# Patient Record
Sex: FEMALE | Race: WHITE | NOT HISPANIC OR LATINO | URBAN - METROPOLITAN AREA
[De-identification: names, ages, dates, MRNs, and addresses within clinical notes are randomized per-mention and may not be internally consistent; named-entity substitution may affect disease eponyms.]

---

## 2018-03-05 ENCOUNTER — OUTPATIENT (OUTPATIENT)
Dept: OUTPATIENT SERVICES | Facility: HOSPITAL | Age: 43
LOS: 1 days | Discharge: HOME | End: 2018-03-05

## 2018-03-06 DIAGNOSIS — N89.8 OTHER SPECIFIED NONINFLAMMATORY DISORDERS OF VAGINA: ICD-10-CM

## 2018-03-31 ENCOUNTER — INPATIENT (INPATIENT)
Facility: HOSPITAL | Age: 43
LOS: 3 days | Discharge: HOME | End: 2018-04-04
Attending: OBSTETRICS & GYNECOLOGY | Admitting: OBSTETRICS & GYNECOLOGY

## 2018-03-31 VITALS — DIASTOLIC BLOOD PRESSURE: 88 MMHG | HEART RATE: 102 BPM | SYSTOLIC BLOOD PRESSURE: 134 MMHG | TEMPERATURE: 98 F

## 2018-03-31 LAB
ALBUMIN SERPL ELPH-MCNC: 2.9 G/DL — LOW (ref 3.5–5.2)
ALP SERPL-CCNC: 119 U/L — HIGH (ref 30–115)
ALT FLD-CCNC: 8 U/L — SIGNIFICANT CHANGE UP (ref 0–41)
AMPHET UR-MCNC: NEGATIVE — SIGNIFICANT CHANGE UP
ANION GAP SERPL CALC-SCNC: 12 MMOL/L — SIGNIFICANT CHANGE UP (ref 7–14)
APPEARANCE UR: (no result)
APPEARANCE UR: (no result)
AST SERPL-CCNC: 27 U/L — SIGNIFICANT CHANGE UP (ref 0–41)
BACTERIA # UR AUTO: (no result) /HPF
BACTERIA # UR AUTO: (no result) /HPF
BARBITURATES UR SCN-MCNC: NEGATIVE — SIGNIFICANT CHANGE UP
BASOPHILS # BLD AUTO: 0.05 K/UL — SIGNIFICANT CHANGE UP (ref 0–0.2)
BASOPHILS # BLD AUTO: 0.07 K/UL — SIGNIFICANT CHANGE UP (ref 0–0.2)
BASOPHILS NFR BLD AUTO: 0.3 % — SIGNIFICANT CHANGE UP (ref 0–1)
BASOPHILS NFR BLD AUTO: 0.3 % — SIGNIFICANT CHANGE UP (ref 0–1)
BENZODIAZ UR-MCNC: NEGATIVE — SIGNIFICANT CHANGE UP
BILIRUB SERPL-MCNC: 0.4 MG/DL — SIGNIFICANT CHANGE UP (ref 0.2–1.2)
BILIRUB UR-MCNC: NEGATIVE — SIGNIFICANT CHANGE UP
BILIRUB UR-MCNC: NEGATIVE — SIGNIFICANT CHANGE UP
BLD GP AB SCN SERPL QL: SIGNIFICANT CHANGE UP
BUN SERPL-MCNC: 8 MG/DL — LOW (ref 10–20)
CALCIUM SERPL-MCNC: 8 MG/DL — LOW (ref 8.5–10.1)
CHLORIDE SERPL-SCNC: 102 MMOL/L — SIGNIFICANT CHANGE UP (ref 98–110)
CO2 SERPL-SCNC: 25 MMOL/L — SIGNIFICANT CHANGE UP (ref 17–32)
COCAINE METAB.OTHER UR-MCNC: NEGATIVE — SIGNIFICANT CHANGE UP
COLOR SPEC: YELLOW — SIGNIFICANT CHANGE UP
COLOR SPEC: YELLOW — SIGNIFICANT CHANGE UP
CREAT SERPL-MCNC: 0.6 MG/DL — LOW (ref 0.7–1.5)
DIFF PNL FLD: (no result)
DIFF PNL FLD: (no result)
EOSINOPHIL # BLD AUTO: 0.02 K/UL — SIGNIFICANT CHANGE UP (ref 0–0.7)
EOSINOPHIL # BLD AUTO: 0.04 K/UL — SIGNIFICANT CHANGE UP (ref 0–0.7)
EOSINOPHIL NFR BLD AUTO: 0.1 % — SIGNIFICANT CHANGE UP (ref 0–8)
EOSINOPHIL NFR BLD AUTO: 0.2 % — SIGNIFICANT CHANGE UP (ref 0–8)
EPI CELLS # UR: (no result) /HPF
EPI CELLS # UR: (no result) /HPF
GLUCOSE SERPL-MCNC: 66 MG/DL — LOW (ref 70–99)
GLUCOSE UR QL: NEGATIVE MG/DL — SIGNIFICANT CHANGE UP
GLUCOSE UR QL: NEGATIVE MG/DL — SIGNIFICANT CHANGE UP
HCT VFR BLD CALC: 30.8 % — LOW (ref 37–47)
HCT VFR BLD CALC: 36 % — LOW (ref 37–47)
HGB BLD-MCNC: 10.6 G/DL — LOW (ref 12–16)
HGB BLD-MCNC: 12.5 G/DL — SIGNIFICANT CHANGE UP (ref 12–16)
IMM GRANULOCYTES NFR BLD AUTO: 1 % — HIGH (ref 0.1–0.3)
IMM GRANULOCYTES NFR BLD AUTO: 1.1 % — HIGH (ref 0.1–0.3)
KETONES UR-MCNC: NEGATIVE — SIGNIFICANT CHANGE UP
KETONES UR-MCNC: NEGATIVE — SIGNIFICANT CHANGE UP
LEUKOCYTE ESTERASE UR-ACNC: (no result)
LEUKOCYTE ESTERASE UR-ACNC: NEGATIVE — SIGNIFICANT CHANGE UP
LYMPHOCYTES # BLD AUTO: 13.1 % — LOW (ref 20.5–51.1)
LYMPHOCYTES # BLD AUTO: 2.32 K/UL — SIGNIFICANT CHANGE UP (ref 1.2–3.4)
LYMPHOCYTES # BLD AUTO: 2.62 K/UL — SIGNIFICANT CHANGE UP (ref 1.2–3.4)
LYMPHOCYTES # BLD AUTO: 9.3 % — LOW (ref 20.5–51.1)
MCHC RBC-ENTMCNC: 33.2 PG — HIGH (ref 27–31)
MCHC RBC-ENTMCNC: 33.4 PG — HIGH (ref 27–31)
MCHC RBC-ENTMCNC: 34.4 G/DL — SIGNIFICANT CHANGE UP (ref 32–37)
MCHC RBC-ENTMCNC: 34.7 G/DL — SIGNIFICANT CHANGE UP (ref 32–37)
MCV RBC AUTO: 95.5 FL — SIGNIFICANT CHANGE UP (ref 81–99)
MCV RBC AUTO: 97.2 FL — SIGNIFICANT CHANGE UP (ref 81–99)
METHADONE UR-MCNC: NEGATIVE — SIGNIFICANT CHANGE UP
MONOCYTES # BLD AUTO: 0.82 K/UL — HIGH (ref 0.1–0.6)
MONOCYTES # BLD AUTO: 0.99 K/UL — HIGH (ref 0.1–0.6)
MONOCYTES NFR BLD AUTO: 4 % — SIGNIFICANT CHANGE UP (ref 1.7–9.3)
MONOCYTES NFR BLD AUTO: 4.1 % — SIGNIFICANT CHANGE UP (ref 1.7–9.3)
NEUTROPHILS # BLD AUTO: 16.2 K/UL — HIGH (ref 1.4–6.5)
NEUTROPHILS # BLD AUTO: 21.39 K/UL — HIGH (ref 1.4–6.5)
NEUTROPHILS NFR BLD AUTO: 81.2 % — HIGH (ref 42.2–75.2)
NEUTROPHILS NFR BLD AUTO: 85.3 % — HIGH (ref 42.2–75.2)
NITRITE UR-MCNC: NEGATIVE — SIGNIFICANT CHANGE UP
NITRITE UR-MCNC: NEGATIVE — SIGNIFICANT CHANGE UP
NRBC # BLD: 0 /100 WBCS — SIGNIFICANT CHANGE UP (ref 0–0)
NRBC # BLD: 0 /100 WBCS — SIGNIFICANT CHANGE UP (ref 0–0)
OPIATES UR-MCNC: NEGATIVE — SIGNIFICANT CHANGE UP
PCP SPEC-MCNC: SIGNIFICANT CHANGE UP
PH UR: 6 — SIGNIFICANT CHANGE UP (ref 5–8)
PH UR: 7 — SIGNIFICANT CHANGE UP (ref 5–8)
PLATELET # BLD AUTO: 238 K/UL — SIGNIFICANT CHANGE UP (ref 130–400)
PLATELET # BLD AUTO: 315 K/UL — SIGNIFICANT CHANGE UP (ref 130–400)
POTASSIUM SERPL-MCNC: 4.5 MMOL/L — SIGNIFICANT CHANGE UP (ref 3.5–5)
POTASSIUM SERPL-SCNC: 4.5 MMOL/L — SIGNIFICANT CHANGE UP (ref 3.5–5)
PRENATAL SYPHILIS TEST: SIGNIFICANT CHANGE UP
PROPOXYPHENE QUALITATIVE URINE RESULT: NEGATIVE — SIGNIFICANT CHANGE UP
PROT SERPL-MCNC: 5 G/DL — LOW (ref 6–8)
PROT UR-MCNC: (no result) MG/DL
PROT UR-MCNC: NEGATIVE MG/DL — SIGNIFICANT CHANGE UP
RBC # BLD: 3.17 M/UL — LOW (ref 4.2–5.4)
RBC # BLD: 3.77 M/UL — LOW (ref 4.2–5.4)
RBC # FLD: 12.1 % — SIGNIFICANT CHANGE UP (ref 11.5–14.5)
RBC # FLD: 12.2 % — SIGNIFICANT CHANGE UP (ref 11.5–14.5)
RBC CASTS # UR COMP ASSIST: (no result) /HPF
SODIUM SERPL-SCNC: 139 MMOL/L — SIGNIFICANT CHANGE UP (ref 135–146)
SP GR SPEC: 1.01 — SIGNIFICANT CHANGE UP (ref 1.01–1.03)
SP GR SPEC: 1.01 — SIGNIFICANT CHANGE UP (ref 1.01–1.03)
TYPE + AB SCN PNL BLD: SIGNIFICANT CHANGE UP
UROBILINOGEN FLD QL: 0.2 MG/DL — SIGNIFICANT CHANGE UP (ref 0.2–0.2)
UROBILINOGEN FLD QL: 0.2 MG/DL — SIGNIFICANT CHANGE UP (ref 0.2–0.2)
WBC # BLD: 19.94 K/UL — HIGH (ref 4.8–10.8)
WBC # BLD: 25.05 K/UL — HIGH (ref 4.8–10.8)
WBC # FLD AUTO: 19.94 K/UL — HIGH (ref 4.8–10.8)
WBC # FLD AUTO: 25.05 K/UL — HIGH (ref 4.8–10.8)
WBC UR QL: SIGNIFICANT CHANGE UP /HPF

## 2018-03-31 RX ORDER — OXYCODONE AND ACETAMINOPHEN 5; 325 MG/1; MG/1
2 TABLET ORAL EVERY 6 HOURS
Qty: 0 | Refills: 0 | Status: DISCONTINUED | OUTPATIENT
Start: 2018-03-31 | End: 2018-04-04

## 2018-03-31 RX ORDER — AMPICILLIN TRIHYDRATE 250 MG
2 CAPSULE ORAL ONCE
Qty: 0 | Refills: 0 | Status: DISCONTINUED | OUTPATIENT
Start: 2018-03-31 | End: 2018-03-31

## 2018-03-31 RX ORDER — LANOLIN
1 OINTMENT (GRAM) TOPICAL
Qty: 0 | Refills: 0 | Status: DISCONTINUED | OUTPATIENT
Start: 2018-03-31 | End: 2018-04-04

## 2018-03-31 RX ORDER — DOCUSATE SODIUM 100 MG
100 CAPSULE ORAL
Qty: 0 | Refills: 0 | Status: DISCONTINUED | OUTPATIENT
Start: 2018-03-31 | End: 2018-04-04

## 2018-03-31 RX ORDER — OXYTOCIN 10 UNIT/ML
41.67 VIAL (ML) INJECTION
Qty: 20 | Refills: 0 | Status: DISCONTINUED | OUTPATIENT
Start: 2018-03-31 | End: 2018-04-04

## 2018-03-31 RX ORDER — OXYCODONE AND ACETAMINOPHEN 5; 325 MG/1; MG/1
1 TABLET ORAL
Qty: 0 | Refills: 0 | Status: DISCONTINUED | OUTPATIENT
Start: 2018-03-31 | End: 2018-04-04

## 2018-03-31 RX ORDER — GENTAMICIN SULFATE 40 MG/ML
80 VIAL (ML) INJECTION EVERY 8 HOURS
Qty: 0 | Refills: 0 | Status: DISCONTINUED | OUTPATIENT
Start: 2018-03-31 | End: 2018-03-31

## 2018-03-31 RX ORDER — ENOXAPARIN SODIUM 100 MG/ML
40 INJECTION SUBCUTANEOUS AT BEDTIME
Qty: 0 | Refills: 0 | Status: DISCONTINUED | OUTPATIENT
Start: 2018-03-31 | End: 2018-04-04

## 2018-03-31 RX ORDER — GENTAMICIN SULFATE 40 MG/ML
VIAL (ML) INJECTION
Qty: 0 | Refills: 0 | Status: COMPLETED | OUTPATIENT
Start: 2018-03-31 | End: 2018-04-01

## 2018-03-31 RX ORDER — IBUPROFEN 200 MG
600 TABLET ORAL EVERY 6 HOURS
Qty: 0 | Refills: 0 | Status: DISCONTINUED | OUTPATIENT
Start: 2018-03-31 | End: 2018-04-04

## 2018-03-31 RX ORDER — FERROUS SULFATE 325(65) MG
325 TABLET ORAL DAILY
Qty: 0 | Refills: 0 | Status: DISCONTINUED | OUTPATIENT
Start: 2018-03-31 | End: 2018-04-04

## 2018-03-31 RX ORDER — OXYTOCIN 10 UNIT/ML
41.67 VIAL (ML) INJECTION
Qty: 20 | Refills: 0 | Status: DISCONTINUED | OUTPATIENT
Start: 2018-03-31 | End: 2018-03-31

## 2018-03-31 RX ORDER — SIMETHICONE 80 MG/1
80 TABLET, CHEWABLE ORAL EVERY 8 HOURS
Qty: 0 | Refills: 0 | Status: DISCONTINUED | OUTPATIENT
Start: 2018-03-31 | End: 2018-04-04

## 2018-03-31 RX ORDER — GENTAMICIN SULFATE 40 MG/ML
VIAL (ML) INJECTION
Qty: 0 | Refills: 0 | Status: DISCONTINUED | OUTPATIENT
Start: 2018-03-31 | End: 2018-03-31

## 2018-03-31 RX ORDER — CEFAZOLIN SODIUM 1 G
2000 VIAL (EA) INJECTION ONCE
Qty: 0 | Refills: 0 | Status: COMPLETED | OUTPATIENT
Start: 2018-03-31 | End: 2018-03-31

## 2018-03-31 RX ORDER — AMPICILLIN TRIHYDRATE 250 MG
2 CAPSULE ORAL EVERY 6 HOURS
Qty: 0 | Refills: 0 | Status: COMPLETED | OUTPATIENT
Start: 2018-03-31 | End: 2018-04-01

## 2018-03-31 RX ORDER — AMPICILLIN TRIHYDRATE 250 MG
CAPSULE ORAL
Qty: 0 | Refills: 0 | Status: DISCONTINUED | OUTPATIENT
Start: 2018-03-31 | End: 2018-03-31

## 2018-03-31 RX ORDER — GENTAMICIN SULFATE 40 MG/ML
80 VIAL (ML) INJECTION EVERY 8 HOURS
Qty: 0 | Refills: 0 | Status: COMPLETED | OUTPATIENT
Start: 2018-03-31 | End: 2018-04-01

## 2018-03-31 RX ORDER — BUTORPHANOL TARTRATE 2 MG/ML
2 INJECTION, SOLUTION INTRAMUSCULAR; INTRAVENOUS ONCE
Qty: 0 | Refills: 0 | Status: COMPLETED | OUTPATIENT
Start: 2018-03-31 | End: 2018-03-31

## 2018-03-31 RX ORDER — AMPICILLIN TRIHYDRATE 250 MG
CAPSULE ORAL
Qty: 0 | Refills: 0 | Status: COMPLETED | OUTPATIENT
Start: 2018-03-31 | End: 2018-04-01

## 2018-03-31 RX ORDER — DIPHENHYDRAMINE HCL 50 MG
25 CAPSULE ORAL EVERY 6 HOURS
Qty: 0 | Refills: 0 | Status: DISCONTINUED | OUTPATIENT
Start: 2018-03-31 | End: 2018-04-04

## 2018-03-31 RX ORDER — OXYTOCIN 10 UNIT/ML
333.33 VIAL (ML) INJECTION
Qty: 20 | Refills: 0 | Status: DISCONTINUED | OUTPATIENT
Start: 2018-03-31 | End: 2018-04-04

## 2018-03-31 RX ORDER — AMPICILLIN TRIHYDRATE 250 MG
2 CAPSULE ORAL EVERY 6 HOURS
Qty: 0 | Refills: 0 | Status: DISCONTINUED | OUTPATIENT
Start: 2018-03-31 | End: 2018-03-31

## 2018-03-31 RX ORDER — SODIUM CHLORIDE 9 MG/ML
500 INJECTION, SOLUTION INTRAVENOUS ONCE
Qty: 0 | Refills: 0 | Status: DISCONTINUED | OUTPATIENT
Start: 2018-03-31 | End: 2018-03-31

## 2018-03-31 RX ORDER — AMPICILLIN TRIHYDRATE 250 MG
2 CAPSULE ORAL EVERY 8 HOURS
Qty: 0 | Refills: 0 | Status: DISCONTINUED | OUTPATIENT
Start: 2018-03-31 | End: 2018-03-31

## 2018-03-31 RX ORDER — MORPHINE SULFATE 50 MG/1
3 CAPSULE, EXTENDED RELEASE ORAL ONCE
Qty: 0 | Refills: 0 | Status: DISCONTINUED | OUTPATIENT
Start: 2018-03-31 | End: 2018-03-31

## 2018-03-31 RX ORDER — GENTAMICIN SULFATE 40 MG/ML
80 VIAL (ML) INJECTION ONCE
Qty: 0 | Refills: 0 | Status: COMPLETED | OUTPATIENT
Start: 2018-03-31 | End: 2018-03-31

## 2018-03-31 RX ORDER — SODIUM CHLORIDE 9 MG/ML
1000 INJECTION, SOLUTION INTRAVENOUS
Qty: 0 | Refills: 0 | Status: DISCONTINUED | OUTPATIENT
Start: 2018-03-31 | End: 2018-03-31

## 2018-03-31 RX ORDER — SODIUM CHLORIDE 9 MG/ML
1000 INJECTION, SOLUTION INTRAVENOUS
Qty: 0 | Refills: 0 | Status: DISCONTINUED | OUTPATIENT
Start: 2018-03-31 | End: 2018-04-04

## 2018-03-31 RX ORDER — GENTAMICIN SULFATE 40 MG/ML
80 VIAL (ML) INJECTION ONCE
Qty: 0 | Refills: 0 | Status: DISCONTINUED | OUTPATIENT
Start: 2018-03-31 | End: 2018-03-31

## 2018-03-31 RX ORDER — KETOROLAC TROMETHAMINE 30 MG/ML
30 SYRINGE (ML) INJECTION ONCE
Qty: 0 | Refills: 0 | Status: DISCONTINUED | OUTPATIENT
Start: 2018-03-31 | End: 2018-03-31

## 2018-03-31 RX ORDER — AMPICILLIN TRIHYDRATE 250 MG
2 CAPSULE ORAL ONCE
Qty: 0 | Refills: 0 | Status: COMPLETED | OUTPATIENT
Start: 2018-03-31 | End: 2018-03-31

## 2018-03-31 RX ORDER — ACETAMINOPHEN 500 MG
650 TABLET ORAL EVERY 6 HOURS
Qty: 0 | Refills: 0 | Status: DISCONTINUED | OUTPATIENT
Start: 2018-03-31 | End: 2018-04-04

## 2018-03-31 RX ORDER — ONDANSETRON 8 MG/1
4 TABLET, FILM COATED ORAL EVERY 6 HOURS
Qty: 0 | Refills: 0 | Status: DISCONTINUED | OUTPATIENT
Start: 2018-03-31 | End: 2018-04-04

## 2018-03-31 RX ADMIN — Medication 216 GRAM(S): at 13:12

## 2018-03-31 RX ADMIN — ENOXAPARIN SODIUM 40 MILLIGRAM(S): 100 INJECTION SUBCUTANEOUS at 22:48

## 2018-03-31 RX ADMIN — Medication 325 MILLIGRAM(S): at 14:25

## 2018-03-31 RX ADMIN — Medication 100 MILLIGRAM(S): at 03:49

## 2018-03-31 RX ADMIN — Medication 200 MILLIGRAM(S): at 06:46

## 2018-03-31 RX ADMIN — Medication 216 GRAM(S): at 17:34

## 2018-03-31 RX ADMIN — Medication 125 MILLIUNIT(S)/MIN: at 17:33

## 2018-03-31 RX ADMIN — Medication 100 MILLIGRAM(S): at 21:04

## 2018-03-31 RX ADMIN — Medication 200 MILLIGRAM(S): at 16:07

## 2018-03-31 RX ADMIN — Medication 30 MILLIGRAM(S): at 08:17

## 2018-03-31 RX ADMIN — Medication 100 MILLIGRAM(S): at 06:29

## 2018-03-31 RX ADMIN — SIMETHICONE 80 MILLIGRAM(S): 80 TABLET, CHEWABLE ORAL at 22:48

## 2018-03-31 RX ADMIN — Medication 100 MILLIGRAM(S): at 14:23

## 2018-03-31 RX ADMIN — Medication 216 GRAM(S): at 08:01

## 2018-03-31 RX ADMIN — Medication 200 MILLIGRAM(S): at 22:47

## 2018-03-31 NOTE — PROGRESS NOTE ADULT - SUBJECTIVE AND OBJECTIVE BOX
Postpartum Note,  Section  She is a  42y woman who is now post-operative day 0.    The patient feels well, her pain is well controlled. She reports minimal vaginal bleeding. She did not pass gas or bowel movements yet. Denies headaches, blurry vision, chest pain, SOB, epigastric pain and leg swelling. She is bottle feeding the baby.     Physical exam:    Vital Signs Last 24 Hrs  T(C): 36.4 (31 Mar 2018 15:25), Max: 37.1 (31 Mar 2018 00:37)  T(F): 97.6 (31 Mar 2018 15:25), Max: 98.8 (31 Mar 2018 00:37)  HR: 87 (31 Mar 2018 15:25) (60 - 110)  BP: 118/62 (31 Mar 2018 15:25) (118/62 - 163/92)  RR: 18 (31 Mar 2018 15:25) (18 - 20)  SpO2: 98% (31 Mar 2018 08:54) (74% - 100%)    UO: 750 cc (0685-6354) 300cc/hr      Gen: NAD  Abdomen: Soft, nontender, no distension , firm uterine fundus at umbilicus. Hypoactive bowel sounds.   Incision: Clean, dry, and intact with steri strips, dressing in place  Pelvic: Normal lochia noted  Ext: No calf tenderness or edema    LABS:                        12.5   19.94 )-----------( 315      ( 31 Mar 2018 03:34 )             36.0     Assessment and Plan  s/p primary stat C/S for fetal bradycardia, on triple antibiotics, gHTN, now with normal BPs, doing well.   - d/c thomas when durmorph is off with TOV  - pain management PRN  - encourage hydration and ambulation  - f/u PM labs  - advance to full liquid diet later today  - f/u BPs

## 2018-03-31 NOTE — OB RN DELIVERY SUMMARY - NS_EXTRAMURALDEL_OBGYN_ALL_OB
LOV- 3/10/17.     Pt is requesting pain medication- tramadol but is out of post-op period.  Please advise. Las filled 2/6/17 per [place]-PDMP.    No

## 2018-03-31 NOTE — PROGRESS NOTE ADULT - SUBJECTIVE AND OBJECTIVE BOX
Patient POD1, STAT c-sec due to bradycardia, having some elevated BPs post-operative.   Patient reports that she already had some elevated BPs in office during prenatal care, but was never on meds.   Denies headaches, blurry vision, RUQ/epigastric pain. Denies chest pain, SOB.  One severe range /92 @0544, repeat was 157/96.     Vital Signs Last 24 Hrs  T(C): 36.6 (31 Mar 2018 06:15), Max: 37.1 (31 Mar 2018 00:37)  T(F): 97.9 (31 Mar 2018 06:15), Max: 98.8 (31 Mar 2018 00:37)  HR: 84 (31 Mar 2018 07:19) (60 - 110)  BP: 146/73 (31 Mar 2018 07:14) (131/83 - 163/92)  RR: 20 (31 Mar 2018 06:45) (18 - 20)  SpO2: 98% (31 Mar 2018 07:19) (74% - 100%)    Will continue to monitor BPs, if needed start on anti-hypertensive meds  UA, UTP/cr sent  CBC and CMP at 1600 Patient POD1, STAT c-sec due to bradycardia, having some elevated BPs post-operative.   Patient reports that she already had some elevated BPs in office during prenatal care, but was never on meds.   Denies headaches, blurry vision, RUQ/epigastric pain. Denies chest pain, SOB.  One severe range /92 @0544, repeat was 157/96.     Vital Signs Last 24 Hrs  T(C): 36.6 (31 Mar 2018 06:15), Max: 37.1 (31 Mar 2018 00:37)  T(F): 97.9 (31 Mar 2018 06:15), Max: 98.8 (31 Mar 2018 00:37)  HR: 84 (31 Mar 2018 07:19) (60 - 110)  BP: 146/73 (31 Mar 2018 07:14) (131/83 - 163/92)  RR: 20 (31 Mar 2018 06:45) (18 - 20)  SpO2: 98% (31 Mar 2018 07:19) (74% - 100%)    GEN: NAD alert and oriented   Lungs: CTABL   CVS: RRR s1/s2   ABD: soft, not tender or distended, uterus firm at the umbilicus   VE: deferred, normal lochia   EXT: no calf tenderness or edema    Will continue to monitor BPs, likely gHTN r/o preeclampsia  If needed start on anti-hypertensive meds  UA, UTP/cr sent  CBC and CMP at 1600      Will inform Dr Ortega.

## 2018-03-31 NOTE — OB PROVIDER TRIAGE NOTE - HISTORY OF PRESENT ILLNESS
40 yo  at 39w3d with GIA 4/4 by first trimester sono presents for ctx which started earlier this evening, 10/10 at worse, every 5 min. Good FM, denies LOF or vaginal bleeding. Reports 2VC founds on sono.

## 2018-03-31 NOTE — OB PROVIDER H&P - ASSESSMENT
41yo  at 39w3d GA presents with contractions, now SROM, in labor,  -admit to labor and delivery  -clear liquid diet  -IV fluids  -admission labs  -pain mgmt prn, stadol vs epidural    Dr Arreola and Dr Viera aware 43yo  at 39w3d GA presents with contractions, now SROM, in labor,  -admit to labor and delivery  -clear liquid diet  -IV fluids  -admission labs  -pain mgmt prn, stadol vs epidural    Dr Arreola and Dr Giron aware

## 2018-03-31 NOTE — OB PROVIDER H&P - HISTORY OF PRESENT ILLNESS
41yo  at 39w3d GA presented with contractions, approximately 5 minutes apart, 10/10 intensity.  Denied LOF, vaginal bleeding.  Reported good fetal movement.  VE 1/90/-1, vtx, intact.  Moved to triage for therapeutic rest, unable to sit still for fetal heart monitoring or IV placement.  Pt reexamined found to be 2/100/-1.  IV placed, SROM at that time.  Pt foudn to be 7cm dilated. 43yo  at 39w3d GA presented with contractions, approximately 5 minutes apart, 10/10 intensity.  Denied LOF, vaginal bleeding.  Reported good fetal movement.  VE 1/90/-1, vtx, intact.  Moved to triage for therapeutic rest, unable to sit still for fetal heart monitoring or IV placement.  Pt reexamined found to be 2/100/-1.  IV placed, SROM at that time.  Pt found to be 7cm dilated.

## 2018-03-31 NOTE — OB PROVIDER H&P - NSHPPHYSICALEXAM_GEN_ALL_CORE
Vital Signs Last 24 Hrs  T(C): 36.5, Max: 37.1   T(F): 97.7, Max: 98.8  HR: 60  (60 - 110)  BP: 134/88 (134/88 - 139/80)  RR: 18 (18 - 20)  SpO2: 74%     Gen: in distress, unable to sit still, vomiting  Abd: palpable contractions  VE: 7/100/0, SROM, light mec

## 2018-03-31 NOTE — PROGRESS NOTE ADULT - SUBJECTIVE AND OBJECTIVE BOX
PGY2 note    Pt in recovery room, can not sit still for IV placement or fetal heart monitoring.  Explained to patient that she was stay in bed for therapeutic rest and that she must have at least 20 minutes of fetal heart monitoring.  Pt verbalized understanding.  Pt able to lay flat in stretcher, IV placed, SROM at that time, light mec, reexamined, 7/100/0.  Transferred to room 8 for labor.    Dr Arreola and Dr Viera aware

## 2018-03-31 NOTE — BRIEF OPERATIVE NOTE - PROCEDURE
<<-----Click on this checkbox to enter Procedure Emergency  section due to fetal distress  2018    Active  RPADHY

## 2018-03-31 NOTE — OB PROVIDER H&P - NSHPLABSRESULTS_GEN_ALL_CORE
Sequential screen neg  Nips neg    Sono  36.5w sono 3092g (EFW 56%), Cephalic, 2Vc, Ant placenta, MVP 60mm, BPP 8/8  34.5w sono 2647g (55%), Cephalic, 2VC, Ant placenta, MVP 58mm, BPP 8/8  31.5w sono 2104g (63%), Cephalic, 2VC, Ant placenta, MVP 41mm, BPP 8/8  26.6w sono 1194g (68%), Cephalic, 2VC, Ant placenta, MVP 53mm, BPP 8/8  22.6w sono 677g (65%), Transverse, 2VC, Ant placenta,  normal anatomy  18.5w sono 287g, Transverse, 2VC, Ant placenta,  normal anatomy  12.5w sono: Dating and viability

## 2018-04-01 LAB
HCT VFR BLD CALC: 28.5 % — LOW (ref 37–47)
HGB BLD-MCNC: 9.7 G/DL — LOW (ref 12–16)
MCHC RBC-ENTMCNC: 33 PG — HIGH (ref 27–31)
MCHC RBC-ENTMCNC: 34 G/DL — SIGNIFICANT CHANGE UP (ref 32–37)
MCV RBC AUTO: 96.9 FL — SIGNIFICANT CHANGE UP (ref 81–99)
NRBC # BLD: 0 /100 WBCS — SIGNIFICANT CHANGE UP (ref 0–0)
PLATELET # BLD AUTO: 247 K/UL — SIGNIFICANT CHANGE UP (ref 130–400)
RBC # BLD: 2.94 M/UL — LOW (ref 4.2–5.4)
RBC # FLD: 12.4 % — SIGNIFICANT CHANGE UP (ref 11.5–14.5)
WBC # BLD: 22.47 K/UL — HIGH (ref 4.8–10.8)
WBC # FLD AUTO: 22.47 K/UL — HIGH (ref 4.8–10.8)

## 2018-04-01 RX ADMIN — Medication 600 MILLIGRAM(S): at 01:30

## 2018-04-01 RX ADMIN — SIMETHICONE 80 MILLIGRAM(S): 80 TABLET, CHEWABLE ORAL at 23:09

## 2018-04-01 RX ADMIN — Medication 100 MILLIGRAM(S): at 05:37

## 2018-04-01 RX ADMIN — Medication 600 MILLIGRAM(S): at 18:40

## 2018-04-01 RX ADMIN — ENOXAPARIN SODIUM 40 MILLIGRAM(S): 100 INJECTION SUBCUTANEOUS at 23:09

## 2018-04-01 RX ADMIN — Medication 325 MILLIGRAM(S): at 12:28

## 2018-04-01 RX ADMIN — Medication 600 MILLIGRAM(S): at 07:29

## 2018-04-01 RX ADMIN — Medication 1 TABLET(S): at 12:29

## 2018-04-01 RX ADMIN — Medication 600 MILLIGRAM(S): at 19:20

## 2018-04-01 RX ADMIN — Medication 600 MILLIGRAM(S): at 06:48

## 2018-04-01 RX ADMIN — Medication 200 MILLIGRAM(S): at 05:37

## 2018-04-01 RX ADMIN — Medication 600 MILLIGRAM(S): at 00:41

## 2018-04-01 RX ADMIN — SIMETHICONE 80 MILLIGRAM(S): 80 TABLET, CHEWABLE ORAL at 05:37

## 2018-04-01 RX ADMIN — SIMETHICONE 80 MILLIGRAM(S): 80 TABLET, CHEWABLE ORAL at 16:32

## 2018-04-01 RX ADMIN — Medication 216 GRAM(S): at 00:41

## 2018-04-01 NOTE — PROGRESS NOTE ADULT - SUBJECTIVE AND OBJECTIVE BOX
PGY 4 Note    Patient examined at bedside. Reports 5/10 pain on PO medications. Denies any acute complaints. Tolerating clear diet. Passed no flatus; no BM. Not Breastfeeding. Voiding spontaneously without difficulty post thomas removal.     T(F): 96.2 (18 @ 04:37), Max: 98.7 (18 @ 09:21)  HR: 89 (18 @ 04:37) (77 - 101)  BP: 114/71 (18 @ 04:37) (114/71 - 151/93)  RR: 19 (18 @ 04:37) (18 - 20)  SpO2: 98% (18 @ 08:54) (98% - 100%)    PE  Gen: AAOx3. NAD  CVS: RRR. Nl S1S2  Lungs: CTAB  Abdomen: Soft, non-tender, mod-distended.  +BS x 4. Incision: dressing changed: c/d/i  Fundus: Firm, below the umbilicus  Lochia: minimal  Ext: No calf tenderness, no edema    MEICATIONS  (STANDING):  enoxaparin Injectable 40 milliGRAM(s) SubCutaneous at bedtime  ferrous    sulfate 325 milliGRAM(s) Oral daily  simethicone 80 milliGRAM(s) Chew every 8 hours      LABS:  Post-op                 10.6   25.05 )-----------( 238      ( 31 Mar 2018 17:55 )             30.8     Pre-op                      12.5   19.94 )-----------( 315      ( 31 Mar 2018 03:34 )             36.0              PEL:   18 @ 17:55  139  |  102  |  8<L>  ----------------------------<  66<L>  4.5   |  25  |  0.6<L>  Ca    8.0<L>      31 Mar 2018 17:55  TPro  5.0<L>  /  Alb  2.9<L>  /  TBili  0.4  /  DBili  x   /  AST  27  /  ALT  8   /  AlkPhos  119<H>  18 @ 17:55  UR HI/CR ratio: pending  UA: trace protein    A/P:  43 y/o , s/p stat C/S for fetal bradycardia, POD - 1, recovering well  -gHTN: awaiting ur pr/cr ratio to r/o PEL: BPs under severe range. Monitor vitals    -Elevated WBC: afebrile with no signs of infection. s/p triple antibiotics x 24 hours. F/U repeat CBC in am.   -Advance diet to regular once patient passes flatus.     YOAN Arreola

## 2018-04-01 NOTE — PROGRESS NOTE ADULT - ATTENDING COMMENTS
Patient seen and examined. Doing well.  Baby in NICU for monitoring due to subdural hematoma on right side. Doing well.  Pain well controlled with PO medication. Ambulating without difficulty. Passed flatus. Urinating.  s/p 24h postop antibitotics  VSS, abdomen soft, nontender, incision c/d/i with steristrips in place.  Routine postop care  Advance to regular diet  Encourage ambulation  Follow up CBC for WBC trending

## 2018-04-02 LAB
CREAT ?TM UR-MCNC: 49 MG/DL — SIGNIFICANT CHANGE UP
HCT VFR BLD CALC: 29.4 % — LOW (ref 37–47)
HGB BLD-MCNC: 9.8 G/DL — LOW (ref 12–16)
MCHC RBC-ENTMCNC: 32.8 PG — HIGH (ref 27–31)
MCHC RBC-ENTMCNC: 33.3 G/DL — SIGNIFICANT CHANGE UP (ref 32–37)
MCV RBC AUTO: 98.3 FL — SIGNIFICANT CHANGE UP (ref 81–99)
NRBC # BLD: 0 /100 WBCS — SIGNIFICANT CHANGE UP (ref 0–0)
PLATELET # BLD AUTO: 279 K/UL — SIGNIFICANT CHANGE UP (ref 130–400)
PROT ?TM UR-MCNC: 24 MG/DLG/24H — SIGNIFICANT CHANGE UP
PROT/CREAT UR-RTO: 0.5 RATIO — HIGH (ref 0–0.2)
RBC # BLD: 2.99 M/UL — LOW (ref 4.2–5.4)
RBC # FLD: 12.4 % — SIGNIFICANT CHANGE UP (ref 11.5–14.5)
WBC # BLD: 18.32 K/UL — HIGH (ref 4.8–10.8)
WBC # FLD AUTO: 18.32 K/UL — HIGH (ref 4.8–10.8)

## 2018-04-02 RX ADMIN — SIMETHICONE 80 MILLIGRAM(S): 80 TABLET, CHEWABLE ORAL at 21:29

## 2018-04-02 RX ADMIN — Medication 600 MILLIGRAM(S): at 18:25

## 2018-04-02 RX ADMIN — Medication 600 MILLIGRAM(S): at 17:50

## 2018-04-02 RX ADMIN — ENOXAPARIN SODIUM 40 MILLIGRAM(S): 100 INJECTION SUBCUTANEOUS at 21:30

## 2018-04-02 RX ADMIN — SIMETHICONE 80 MILLIGRAM(S): 80 TABLET, CHEWABLE ORAL at 14:57

## 2018-04-02 RX ADMIN — Medication 325 MILLIGRAM(S): at 11:44

## 2018-04-02 RX ADMIN — Medication 600 MILLIGRAM(S): at 00:08

## 2018-04-02 RX ADMIN — Medication 600 MILLIGRAM(S): at 23:06

## 2018-04-02 RX ADMIN — SIMETHICONE 80 MILLIGRAM(S): 80 TABLET, CHEWABLE ORAL at 06:13

## 2018-04-02 RX ADMIN — Medication 600 MILLIGRAM(S): at 09:37

## 2018-04-02 RX ADMIN — Medication 600 MILLIGRAM(S): at 06:17

## 2018-04-02 RX ADMIN — Medication 1 TABLET(S): at 11:44

## 2018-04-02 NOTE — PROGRESS NOTE ADULT - ATTENDING COMMENTS
IMP:  s/p LTCS - POD 2 - Doing Well    Plan:  f/u CBC, Ambulation, Regular Diet, Anticipated d/c 4/3

## 2018-04-02 NOTE — PROGRESS NOTE ADULT - SUBJECTIVE AND OBJECTIVE BOX
PGY 4 Note    Patient examined at bedside. Denies any acute complaints. Tolerating reg diet. Passed flatus; no BM. Not Breastfeeding. Voiding spontaneously without difficulty.     T(F): 98.2 (18 @ 03:09), Max: 98.2 (18 @ 03:09)  HR: 87 (18 @ 03:09) (87 - 107)  BP: 120/70 (18 @ 03:09) (115/69 - 123/62)  RR: 18 (18 @ 03:09) (18 - 20)  SpO2: --    Gen: AAOx3. NAD  CVS: RRR. Nl S1S2  Lungs: CTAB  Abdomen: Soft, non-tender, non-distended.  + BS x 4. Incision: c/d/i  Fundus: Firm, below the umbilicus  Lochia: minimal  Ext: No calf tenderness, no edema    MEDICATIONS  (STANDING):  enoxaparin Injectable 40 milliGRAM(s) SubCutaneous at bedtime  ferrous    sulfate 325 milliGRAM(s) Oral daily  prenatal multivitamin 1 Tablet(s) Oral daily  simethicone 80 milliGRAM(s) Chew every 8 hours      LABS:                        9.7    22.47 )-----------( 247      ( 2018 12:47 )             28.5                         10.6   25.05 )-----------( 238      ( 31 Mar 2018 17:55 )             30.8     Pre-op             12.5   19.94<H> )-----------( 315      (  @ 03:34 )             36.0<L>    A/P:  43 y/o , s/p stat C/S for fetal bradycardia, POD - 2, recovering well  -gHTN: awaiting ur pr/cr ratio to r/o PEL: BPs under severe range. Monitor vitals    -Elevated WBC on repeat CBC: afebrile with no signs of infection. s/p triple antibiotics x 24 hours. F/U repeat CBC in am.     YOAN Arreola

## 2018-04-03 RX ADMIN — SIMETHICONE 80 MILLIGRAM(S): 80 TABLET, CHEWABLE ORAL at 14:23

## 2018-04-03 RX ADMIN — Medication 1 TABLET(S): at 11:42

## 2018-04-03 RX ADMIN — SIMETHICONE 80 MILLIGRAM(S): 80 TABLET, CHEWABLE ORAL at 06:19

## 2018-04-03 RX ADMIN — Medication 600 MILLIGRAM(S): at 06:19

## 2018-04-03 RX ADMIN — SIMETHICONE 80 MILLIGRAM(S): 80 TABLET, CHEWABLE ORAL at 06:16

## 2018-04-03 RX ADMIN — ENOXAPARIN SODIUM 40 MILLIGRAM(S): 100 INJECTION SUBCUTANEOUS at 21:24

## 2018-04-03 RX ADMIN — Medication 600 MILLIGRAM(S): at 20:27

## 2018-04-03 RX ADMIN — Medication 600 MILLIGRAM(S): at 06:49

## 2018-04-03 RX ADMIN — Medication 600 MILLIGRAM(S): at 21:00

## 2018-04-03 RX ADMIN — SIMETHICONE 80 MILLIGRAM(S): 80 TABLET, CHEWABLE ORAL at 21:26

## 2018-04-03 RX ADMIN — Medication 325 MILLIGRAM(S): at 11:41

## 2018-04-03 NOTE — PROGRESS NOTE ADULT - ATTENDING COMMENTS
IMP:  s/p LTCS - POD 3 - Doing Well           mildly elevated BP's    Plan:  Continued PP/PO Care/ Monitoring of BP's? Anticipated d/c - 4/4

## 2018-04-03 NOTE — PROGRESS NOTE ADULT - SUBJECTIVE AND OBJECTIVE BOX
PGY 4 Note    Patient examined at bedside. Denies any acute complaints. Tolerating reg diet. Passed flatus; + BM. Not Breastfeeding. Voiding spontaneously without difficulty.     T(F): 97.6 (18 @ 23:54), Max: 97.6 (18 @ 23:54)  HR: 81 (18 @ 03:01) (81 - 96)  BP: 121/72 (18 @ 03:01) (121/72 - 158/90)  RR: 18 (18 @ 23:54) (18 - 20)      Gen: AAOx3. NAD  CVS: RRR. Nl S1S2  Lungs: CTAB  Abdomen: Soft, non-tender, non-distended. + BS x 4. Incision: c/d/i  Fundus: Firm, below the umbilicus  Lochia: minimal  Ext: No calf tenderness, no edema    MEDICATIONS  (STANDING):  enoxaparin Injectable 40 milliGRAM(s) SubCutaneous at bedtime  ferrous    sulfate 325 milliGRAM(s) Oral daily  prenatal multivitamin 1 Tablet(s) Oral daily  simethicone 80 milliGRAM(s) Chew every 8 hours      LABS:  Post-op             9.8<L>  18.32<H> )-----------( 279      (  @ 10:22 )             29.4<L>               9.7<L>  22.47<H> )-----------( 247      (  @ 12:47 )             28.5<L>               10.6<L>  25.05<H> )-----------( 238      (  @ 17:55 )             30.8<L>  Pre-op             12.5   19.94<H> )-----------( 315      (  @ 03:34 )             36.0<L>    18 @ 17:55      139  |  102  |  8<L>  ----------------------------<  66<L>  4.5   |  25  |  0.6<L>    Ca    8.0<L>      31 Mar 2018 17:55  TPro  5.0<L>  /  Alb  2.9<L>  /  TBili  0.4  /  DBili  x   /  AST  27  /  ALT  8   /  AlkPhos  119<H>  18 @ 17:55  Ur Pr/Cr ratio: 0.5      A/P:  43 y/o , s/p stat C/S for fetal bradycardia, POD - 3, recovering well  -PEL without severe features: BPs under severe range. Monitor vitals    -WBC trended down; afebrile with no signs of infection. s/p triple antibiotics x 24 hours.  -Anemia of acute blood loss; asymptomatic  -discharged per PMD discretion      YOAN Arreola

## 2018-04-04 VITALS
TEMPERATURE: 97 F | HEART RATE: 95 BPM | RESPIRATION RATE: 20 BRPM | DIASTOLIC BLOOD PRESSURE: 74 MMHG | SYSTOLIC BLOOD PRESSURE: 140 MMHG

## 2018-04-04 LAB — SURGICAL PATHOLOGY STUDY: SIGNIFICANT CHANGE UP

## 2018-04-04 RX ORDER — ACETAMINOPHEN 500 MG
2 TABLET ORAL
Qty: 0 | Refills: 0 | COMMUNITY
Start: 2018-04-04

## 2018-04-04 RX ORDER — FERROUS SULFATE 325(65) MG
1 TABLET ORAL
Qty: 90 | Refills: 0 | OUTPATIENT
Start: 2018-04-04 | End: 2018-05-03

## 2018-04-04 RX ORDER — IBUPROFEN 200 MG
1 TABLET ORAL
Qty: 0 | Refills: 0 | COMMUNITY
Start: 2018-04-04

## 2018-04-04 RX ADMIN — Medication 1 TABLET(S): at 11:44

## 2018-04-04 RX ADMIN — SIMETHICONE 80 MILLIGRAM(S): 80 TABLET, CHEWABLE ORAL at 06:41

## 2018-04-04 RX ADMIN — Medication 600 MILLIGRAM(S): at 08:57

## 2018-04-04 RX ADMIN — Medication 325 MILLIGRAM(S): at 11:44

## 2018-04-04 NOTE — DISCHARGE NOTE OB - CARE PROVIDER_API CALL
Bereket Ortega), Obstetrics and Gynecology  [address] [place] [address]  Phone: [phone]  Fax: [phone]

## 2018-04-04 NOTE — DISCHARGE NOTE OB - PATIENT PORTAL LINK FT
You can access the [facility] Patient Portal, offered by [facility], by registering with the following website: [email or web address]

## 2018-04-04 NOTE — DISCHARGE NOTE OB - MEDICATION SUMMARY - MEDICATIONS TO TAKE
I will START or STAY ON the medications listed below when I get home from the hospital:    acetaminophen 325 mg oral tablet  -- 2 tab(s) by mouth every 6 hours, As needed, For Temp greater than 38.5 C (101.3 F)  -- Indication: For LABOR    Percocet 5/325 oral tablet  -- 1 tab(s) by mouth every 4 hours while awake, As Needed -Moderate Pain - for severe pain MDD:6 tabs   -- Indication: For LABOR    ibuprofen 600 mg oral tablet  -- 1 tab(s) by mouth every 6 hours, As needed, Mild pain or headache  -- Indication: For LABOR    Prenatal Multivitamins with Folic Acid 1 mg oral tablet  -- 1 tab(s) by mouth once a day  -- Indication: For LABOR    FeroSul 325 mg (65 mg elemental iron) oral tablet  -- 1 tab(s) by mouth 3 times a day  -- Indication: For LABOR

## 2018-04-04 NOTE — DISCHARGE NOTE OB - CARE PLAN
Principal Discharge DX:	 delivery delivered  Goal:	health recovery  Assessment and plan of treatment:	F/U in one week for BP and incision check

## 2018-04-04 NOTE — PROGRESS NOTE ADULT - ATTENDING COMMENTS
IMP: s/p LTCS - POD 4 - Doing Well    Plan:  D/C Home, NSAID's, f/u office - 1 wk - wound and BP check

## 2018-04-04 NOTE — DISCHARGE NOTE OB - HOSPITAL COURSE
42y P1, s/p state C/S on 3/30/18 for fetal bradycardia, admitted for post-operative care. Voided spontaneously post thomas removal, tolerated regular diet, ambulated with no difficulty.     Intra-operative complications: None  Pertinent in-patient diagnoses: Anemia of acute blood loss, asymptomatic. Leukocytosis; resolved; afebrile       Discharge Labs:              9.8<L>  18.32<H> )-----------( 279      ( 04-02 @ 10:22 )             29.4<L>               9.7<L>  22.47<H> )-----------( 247      ( 04-01 @ 12:47 )             28.5<L>               10.6<L>  25.05<H> )-----------( 238      ( 03-31 @ 17:55 )             30.8<L>               12.5   19.94<H> )-----------( 315      ( 03-31 @ 03:34 )             36.0<L>          Discharged on: POD # 4  Disposition: Home with outpatient follow-up (one week for incision and BP check)

## 2018-04-08 DIAGNOSIS — Z3A.39 39 WEEKS GESTATION OF PREGNANCY: ICD-10-CM

## 2018-04-08 DIAGNOSIS — D62 ACUTE POSTHEMORRHAGIC ANEMIA: ICD-10-CM

## 2018-04-08 DIAGNOSIS — Z33.1 PREGNANT STATE, INCIDENTAL: ICD-10-CM

## 2018-04-12 PROBLEM — Z00.00 ENCOUNTER FOR PREVENTIVE HEALTH EXAMINATION: Status: ACTIVE | Noted: 2018-04-12

## 2018-08-22 ENCOUNTER — OUTPATIENT (OUTPATIENT)
Dept: OUTPATIENT SERVICES | Facility: HOSPITAL | Age: 43
LOS: 1 days | Discharge: HOME | End: 2018-08-22

## 2018-08-22 ENCOUNTER — RESULT REVIEW (OUTPATIENT)
Age: 43
End: 2018-08-22

## 2018-08-23 DIAGNOSIS — R87.810 CERVICAL HIGH RISK HUMAN PAPILLOMAVIRUS (HPV) DNA TEST POSITIVE: ICD-10-CM

## 2018-08-23 DIAGNOSIS — R87.610 ATYPICAL SQUAMOUS CELLS OF UNDETERMINED SIGNIFICANCE ON CYTOLOGIC SMEAR OF CERVIX (ASC-US): ICD-10-CM

## 2018-09-04 NOTE — DISCHARGE NOTE OB - BREASTFEEDING PROVIDES MATERNAL HEALTH BENEFITS, DECREASED PREMENOPAUSAL BREAST CANCER, OVARIAN CANCER AND TYPE II DIABETES MELLITUS
How Severe Are Your Bumps?: moderate
Have Your Bumps Been Treated?: not been treated
Is This A New Presentation, Or A Follow-Up?: Bump
Statement Selected

## 2018-10-05 NOTE — OB RN PATIENT PROFILE - NS_PRENATALSTART_OBGYN_ALL_OB
"Patient: Dada Garcia    Procedure Summary     Date:  10/04/18 Room / Location:      Anesthesia Start:  0855 Anesthesia Stop:  1633    Procedure:  LABOR ANALGESIA Diagnosis:      Scheduled Providers:   Provider:  GERARD Obrien CRNA    Anesthesia Type:  epidural ASA Status:  2          Anesthesia Type: epidural  Last vitals  BP   140/90 (10/05/18 1215)   Temp   98 °F (36.7 °C) (10/05/18 1215)   Pulse   69 (10/05/18 1215)   Resp   18 (10/05/18 1215)     SpO2   96 % (10/05/18 1215)     Post Anesthesia Care and Evaluation    Patient location during evaluation: bedside  Patient participation: complete - patient participated  Level of consciousness: awake and alert  Pain management: adequate  Airway patency: patent  Anesthetic complications: No anesthetic complications    Cardiovascular status: acceptable  Respiratory status: acceptable  Hydration status: acceptable  Post Neuraxial Block status: Motor and sensory function returned to baseline and No signs or symptoms of PDPH  Comments: Blood pressure 140/90, pulse 69, temperature 98 °F (36.7 °C), temperature source Oral, resp. rate 18, height 161.5 cm (63.58\"), SpO2 96 %, currently breastfeeding.    Pt discharged from PACU based on cathi score >8      "
Started first trimester

## 2020-01-14 NOTE — PROGRESS NOTE ADULT - SUBJECTIVE AND OBJECTIVE BOX
PGY 4 Note    Patient examined at bedside. Denies any acute complaints. Tolerating reg diet. Passed  flatus; had BM. Not Breastfeeding. Voiding spontaneously without difficulty.     T(F): 96.7 (18 @ 15:46), Max: 96.7 (18 @ 15:46)  HR: 99 (18 @ 15:46) (85 - 99)  BP: 126/86 (18 @ 15:46) (126/86 - 135/83)  RR: 18 (18 @ 15:46) (18 - 18)    Gen: AAOx3. NAD  CVS: RRR. Nl S1S2  Lungs: CTAB  Abdomen: Soft, non-tender, non-distended.  + BS x 4. Incision: c/d/i  Fundus: Firm, below the umbilicus  Lochia: minimal  Ext: No calf tenderness, no edema      MEDICATIONS  (STANDING):  enoxaparin Injectable 40 milliGRAM(s) SubCutaneous at bedtime  ferrous    sulfate 325 milliGRAM(s) Oral daily  prenatal multivitamin 1 Tablet(s) Oral daily  simethicone 80 milliGRAM(s) Chew every 8 hours    LABS:  Post-op                      9.8<L>  18.32<H> )-----------( 279      (  @ 10:22 )             29.4<L>               9.7<L>  22.47<H> )-----------( 247      (  @ 12:47 )             28.5<L>               10.6<L>  25.05<H> )-----------( 238      (  @ 17:55 )             30.8<L>  Pre-op             12.5   19.94<H> )-----------( 315      (  @ 03:34 )             36.0<L>    A/P:  41 y/o , s/p stat C/S for fetal bradycardia, POD - 4, recovering well  -PEL without severe features: BPs under severe range. Monitor vitals    -WBC trended down; afebrile with no signs of infection. s/p triple antibiotics x 24 hours.  -Anemia of acute blood loss; asymptomatic  -discharg per PMD discretion with follow-up in one week for BP/incision check    YOAN Arreola Advancement Flap (Double) Text: The defect edges were debeveled with a #15 scalpel blade.  Given the location of the defect and the proximity to free margins a double advancement flap was deemed most appropriate.  Using a sterile surgical marker, the appropriate advancement flaps were drawn incorporating the defect and placing the expected incisions within the relaxed skin tension lines where possible.    The area thus outlined was incised deep to adipose tissue with a #15 scalpel blade.  The skin margins were undermined to an appropriate distance in all directions utilizing iris scissors.

## 2021-02-25 NOTE — DISCHARGE NOTE OB - PRO FEM REPRO BREASTFEED YN
Interval Events: Advanced to low fiber diet yesterday, gonzalez removed, voided.    S: Patient doing well, denies fevers, chills, nausea, emesis, chest pain, SOB. Continues to have some abdominal pain, but able to ambulate. (+) flatus (+) BM.     O: Vital Signs  T(C): 36.7 (02-25 @ 04:43), Max: 36.8 (02-24 @ 13:22)  HR: 69 (02-25 @ 04:43) (63 - 85)  BP: 128/68 (02-25 @ 04:43) (128/68 - 150/86)  RR: 17 (02-25 @ 04:43) (15 - 18)  SpO2: 99% (02-25 @ 04:43) (99% - 100%)  02-23-21 @ 07:01  -  02-24-21 @ 07:00  --------------------------------------------------------  IN:  Total IN: 0 mL    OUT:    Indwelling Catheter - Urethral (mL): 1850 mL    Voided (mL): 300 mL  Total OUT: 2150 mL    Total NET: -2150 mL      02-24-21 @ 07:01  -  02-25-21 @ 06:42  --------------------------------------------------------  IN:  Total IN: 0 mL    OUT:    Voided (mL): 750 mL  Total OUT: 750 mL    Total NET: -750 mL        General: alert and oriented, NAD  Resp: airway patent, respirations unlabored  CVS: regular rate and rhythm  Abdomen: soft, appropriately tender, nondistended  Incisions: C/D/I dermabond  Extremities: no edema  Skin: warm, dry, appropriate color                          14.0   8.03  )-----------( 150      ( 24 Feb 2021 06:41 )             42.6   02-24    142  |  107  |  9   ----------------------------<  106<H>  4.0   |  24  |  1.01    Ca    9.5      24 Feb 2021 06:41  Phos  3.5     02-24  Mg     2.0     02-24     no

## 2021-04-04 ENCOUNTER — TRANSCRIPTION ENCOUNTER (OUTPATIENT)
Age: 46
End: 2021-04-04

## 2023-02-09 NOTE — OB PROVIDER H&P - BLOOD TRANSFUSION, PREVIOUS, PROFILE
Goal Outcome Evaluation:  Plan of Care Reviewed With: patient        Progress: improving  Outcome Evaluation: VSS, ra, completd bowel prep and hasd been having clear liquid stools with streaks of pink per pt, will be npo after 0600 for a colonoscopy today at around noon, consent had been signed and tyrell patients chart.   no

## 2023-12-18 NOTE — BRIEF OPERATIVE NOTE - OPERATION/FINDINGS
1. Normal appearing uterus, ovaries and fallopian tubes.  2. Live male infant delivered in cephalic presentation with APGARs 6/9. Weight: 3420g
MR Angio Head Impression 12/18/23: No acute territorial infarcts   Chest Xray 12/17/23: IMPRESSION: Multifocal pneumonia; Diffuse lung infiltrates greatest at the right upper lobe, right lung base and left perihilar region are noted

## 2024-10-28 ENCOUNTER — NON-APPOINTMENT (OUTPATIENT)
Age: 49
End: 2024-10-28

## 2024-10-30 ENCOUNTER — APPOINTMENT (OUTPATIENT)
Dept: ORTHOPEDIC SURGERY | Facility: CLINIC | Age: 49
End: 2024-10-30

## 2024-10-30 DIAGNOSIS — S62.524A NONDISPLACED FRACTURE OF DISTAL PHALANX OF RIGHT THUMB, INITIAL ENCOUNTER FOR CLOSED FRACTURE: ICD-10-CM

## 2024-10-30 PROCEDURE — 99204 OFFICE O/P NEW MOD 45 MIN: CPT | Mod: 25

## 2024-10-30 PROCEDURE — 29130 APPL FINGER SPLINT STATIC: CPT | Mod: RT

## 2024-11-13 ENCOUNTER — APPOINTMENT (OUTPATIENT)
Dept: ORTHOPEDIC SURGERY | Facility: CLINIC | Age: 49
End: 2024-11-13
Payer: COMMERCIAL

## 2024-11-13 DIAGNOSIS — S62.524A NONDISPLACED FRACTURE OF DISTAL PHALANX OF RIGHT THUMB, INITIAL ENCOUNTER FOR CLOSED FRACTURE: ICD-10-CM

## 2024-11-13 PROCEDURE — 73140 X-RAY EXAM OF FINGER(S): CPT | Mod: RT

## 2024-11-13 PROCEDURE — 99213 OFFICE O/P EST LOW 20 MIN: CPT

## 2024-12-04 ENCOUNTER — APPOINTMENT (OUTPATIENT)
Dept: ORTHOPEDIC SURGERY | Facility: CLINIC | Age: 49
End: 2024-12-04

## 2025-05-19 NOTE — OB RN INTRAOPERATIVE RECORD - NS_SKINPREP_OBGYN_ALL_OB
Risks/Benefits discussed  [] Home exercise program  Method of Education: [x] Verbal  [] Demo  [] Written  Comprehension of Education:  [x] Verbalizes understanding.  [] Demonstrates understanding.  [x] Needs Review.  [] Demonstrates/verbalizes understanding of HEP/Ed previously given.        Patient understands diagnosis/prognosis and consents to treatment, plan and goals: [x] Yes    [] No   This patient demonstrates the ability to follow the plan of care and progress towards goals.  Rehab potential is good       Assessment: Secondary Lymphedema; impaired shoulder range of motion, Stage 2, Affecting the Right chest, right Upper Extremity.  Patient will benefit from a Complete Decongestive Therapy protocol using manual lymphatic drainage techniques, skilled multilayer compression bandaging using short stretch bandages and foam padding, instruction in a home program of self massage and exercise, compression garment fitting and instruction in independent management strategies for lymphedema.           Goal Formulation: Patient  Time In: 0805            Time Out: 0910                      Timed Code Treatment Minutes: 65 minutes      CODE  Minutes  Units   10692 OT Eval Low 65 1   76403 OT Eval Medium     39759 OT Eval High     88736 Fluidotherapy     16748 Manual     67562 Therapeutic Ex     94516 Therapeutic Activity     69886 ADL/COMP Tech Train     04804 Neuromuscular Re-Ed     78898 OrthoManagementTraining     11268 Paraffin     71656 Electrical Stim - Attended     00253 Iontophoresis     73293 Ultrasound      Other     Total  65 1        Electronically signed by: ADRIAN Hernandez/L, CLT-REYMUNDO      Physician's Certification / Comments      Frequency/Duration 1-2 / week for 12 visits.   Certification period From: 19May 2025  To: 11Aug. 2025     I have reviewed the Plan of Care established for skilled therapy services and certify that the services are required and that they will be provided while the patient is 
Betadine

## 2025-07-28 ENCOUNTER — NON-APPOINTMENT (OUTPATIENT)
Age: 50
End: 2025-07-28